# Patient Record
Sex: MALE | Race: WHITE
[De-identification: names, ages, dates, MRNs, and addresses within clinical notes are randomized per-mention and may not be internally consistent; named-entity substitution may affect disease eponyms.]

---

## 2017-01-12 ENCOUNTER — HOSPITAL ENCOUNTER (EMERGENCY)
Dept: HOSPITAL 17 - NEPB | Age: 37
Discharge: HOME | End: 2017-01-12
Payer: MEDICAID

## 2017-01-12 VITALS — BODY MASS INDEX: 34.34 KG/M2 | WEIGHT: 253.53 LBS | HEIGHT: 72 IN

## 2017-01-12 VITALS
TEMPERATURE: 98.2 F | RESPIRATION RATE: 20 BRPM | HEART RATE: 60 BPM | OXYGEN SATURATION: 98 % | SYSTOLIC BLOOD PRESSURE: 133 MMHG | DIASTOLIC BLOOD PRESSURE: 86 MMHG

## 2017-01-12 DIAGNOSIS — J02.9: Primary | ICD-10-CM

## 2017-01-12 DIAGNOSIS — Z72.0: ICD-10-CM

## 2017-01-12 PROCEDURE — 99282 EMERGENCY DEPT VISIT SF MDM: CPT

## 2017-01-12 NOTE — PD
HPI


Chief Complaint:  ENT Complaint


Time Seen by Provider:  14:36


Travel History


International Travel<30 days:  No


Contact w/Intl Traveler<30days:  No


Traveled to known affect area:  No





History of Present Illness


HPI


36 showed male presents to the emergency department for evaluation of throat 

pain for one month.  He states it hurts with swallowing and spitting.  He 

denies any fevers or chills.  Patient reports a history of chronic back pain, 

but takes no prescribed medications.  He states he has taken over-the-counter 

anti-inflammatories as well as magic mouthwash without relief.  Patient denies 

any other symptoms or complaints at this time.





PFSH


Past Medical History


Arthritis:  Yes (DJD L HIP??)


Asthma:  Yes (AS CHILD--GREW OUT OF PER PT)


Diminished Hearing:  No


Genitourinary:  Yes


Headaches:  Yes (OCCASSIONAL)


Musculoskeletal:  Yes (BACK PAIN CHRONIC)


Neurologic:  Yes


Immunizations Current:  No


Migraines:  Yes (OCCASSIONAL)


Renal Failure:  No


Seizures:  Yes (AS CHILD/NONE SINCE CHILD/TAKES NO MEDS)





Past Surgical History


Body Medical Devices:  PEIRCED L EYEBROW/TONGUE


Cardiac Surgery:  Yes (AS CHILD "WATER ON MY HEART")


Ear Surgery:  No


Endocrine Surgery:  No


Eye Surgery:  No


Genitourinary Surgery:  No


Gynecologic Surgery:  No


Oral Surgery:  No


Other Surgery:  Yes (BACK SURGERY)





Social History


Alcohol Use:  No


Tobacco Use:  Yes


Substance Use:  No





Allergies-Medications


(Allergen,Severity, Reaction):  


Coded Allergies:  


     Doxycycline (Verified  Allergy, Severe, Anaphylaxis, 8/14/16)


     Septra (Verified  Allergy, Severe, Anaphylaxis, 8/14/16)


Reported Meds & Prescriptions





Reported Meds & Active Scripts


Active


Zofran ODT (Ondansetron HCl) 4 Mg Tab 4 Mg SL Q6H PRN


     FOR NAUSEA/VOMITING


Protonix (Pantoprazole Sodium) 40 Mg Tabdr 40 Mg PO DAILY








Review of Systems


Except as stated in HPI:  all other systems reviewed are Neg





Physical Exam


Narrative


GENERAL: Well-developed well-nourished male patient, ambulatory.  Afebrile.


SKIN: Warm and dry.


HEAD: Normocephalic.  Atraumatic.


ENT: Mucosa pink and moist. No erythema or exudates. No uvular edema. No uvular

, palatal, or tonsillar deviation. Airway patent. Nasal turbinates appear 

normal without nasal blood, purulent drainage or septal hematoma.  Bilateral 

tympanic membranes are clear without erythema or perforation.


EYES: No scleral icterus. No injection or drainage. 


NECK: Supple, trachea midline. No JVD or lymphadenopathy.


CARDIOVASCULAR: Regular rate and rhythm without murmurs, gallops, or rubs. 


RESPIRATORY: Breath sounds equal bilaterally. No accessory muscle use.  Lungs 

sounds clear to auscultation.


MUSCULOSKELETAL: No cyanosis, or edema.





Data


Data


Last Documented VS





Vital Signs








  Date Time  Temp Pulse Resp B/P Pulse Ox O2 Delivery O2 Flow Rate FiO2


 


1/12/17 14:13 98.2 60 20 133/86 98 Room Air  











MDM


Medical Decision Making


Medical Screen Exam Complete:  Yes


Emergency Medical Condition:  Yes


Medical Record Reviewed:  Yes


Differential Diagnosis


Strep pharyngitis versus viral pharyngitis versus URI


Narrative Course


36-year-old male presents to the emergency department for evaluation of sore 

throat for one month.  He denies any other associated symptoms.  He has not 

followed up outpatient for this issue.  Physical exam is reassuring.  I 

stressed to the patient the need to follow up with an ear, nose, and throat 

physician or a primary care physician.  I'll try the patient a short 

prescription of antibiotics to see if this relieves his throat pain.  However, 

he is to follow up regardless.  He is to return for any acute worsening of 

symptoms.





Diagnosis





 Primary Impression:  


 Pharyngitis


 Qualified Code:  J02.9 - Pharyngitis, unspecified etiology


Referrals:  


Ear / Nose / Throat Specialist


call for appointment


Patient Instructions:  General Instructions, Pharyngitis (ED)


Departure Forms:  Tests/Procedures, Work Release   Enter return to work date:  

Jan 13, 2017





***Additional Instructions:


Take antibiotic as directed until gone.  This is free at [facility].


Follow up with an ENT physician.


Return to the emergency department for any acute, worsening of symptoms.


***Med/Other Pt SpecificInfo:  Prescription(s) given


Scripts


Penicillin V Potassium 500 Mg [id number] Mg PO Q8H  10 Days  Ref 0


   Prov:Hortensia Cifuentes         1/12/17


Disposition:  01 DISCHARGE HOME


Condition:  Stable








Hortensia Cifuentes Jan 12, 2017 14:40

## 2017-04-12 ENCOUNTER — HOSPITAL ENCOUNTER (EMERGENCY)
Dept: HOSPITAL 17 - NEPK | Age: 37
Discharge: HOME | End: 2017-04-12
Payer: MEDICAID

## 2017-04-12 ENCOUNTER — HOSPITAL ENCOUNTER (EMERGENCY)
Dept: HOSPITAL 17 - PHEFT | Age: 37
Discharge: HOME | End: 2017-04-12
Payer: MEDICAID

## 2017-04-12 VITALS
TEMPERATURE: 98.7 F | OXYGEN SATURATION: 100 % | HEART RATE: 52 BPM | SYSTOLIC BLOOD PRESSURE: 133 MMHG | DIASTOLIC BLOOD PRESSURE: 92 MMHG | RESPIRATION RATE: 15 BRPM

## 2017-04-12 VITALS — WEIGHT: 281.97 LBS | HEIGHT: 60 IN | BODY MASS INDEX: 55.36 KG/M2

## 2017-04-12 VITALS
OXYGEN SATURATION: 98 % | HEART RATE: 72 BPM | DIASTOLIC BLOOD PRESSURE: 90 MMHG | TEMPERATURE: 98.4 F | RESPIRATION RATE: 16 BRPM | SYSTOLIC BLOOD PRESSURE: 131 MMHG

## 2017-04-12 VITALS — BODY MASS INDEX: 35.83 KG/M2 | HEIGHT: 72 IN | WEIGHT: 264.55 LBS

## 2017-04-12 DIAGNOSIS — B95.62: ICD-10-CM

## 2017-04-12 DIAGNOSIS — L02.415: Primary | ICD-10-CM

## 2017-04-12 DIAGNOSIS — F17.210: ICD-10-CM

## 2017-04-12 PROCEDURE — 99283 EMERGENCY DEPT VISIT LOW MDM: CPT

## 2017-04-12 PROCEDURE — 86403 PARTICLE AGGLUT ANTBDY SCRN: CPT

## 2017-04-12 PROCEDURE — 87070 CULTURE OTHR SPECIMN AEROBIC: CPT

## 2017-04-12 PROCEDURE — 10061 I&D ABSCESS COMP/MULTIPLE: CPT

## 2017-04-12 PROCEDURE — 87205 SMEAR GRAM STAIN: CPT

## 2017-04-12 PROCEDURE — 87186 SC STD MICRODIL/AGAR DIL: CPT

## 2017-04-12 NOTE — PD
HPI


Chief Complaint:  Pain: Acute or Chronic


Time Seen by Provider:  22:34


Travel History


International Travel<30 days:  No


Contact w/Intl Traveler<30days:  No


Traveled to known affect area:  No





History of Present Illness


HPI


36-year-old white male presents to emergency Department with complaints of pain 

after having an I&D done earlier today at [facility].  The patient states 

that he was prescribed Motrin which has not been improving his pain.  He 

presents for stronger pain medication.





PFSH


Past Medical History


Arthritis:  Yes (DJD L HIP??)


Asthma:  Yes (AS CHILD--GREW OUT OF PER PT)


Diminished Hearing:  No


Genitourinary:  Yes


Headaches:  Yes (OCCASSIONAL)


Musculoskeletal:  Yes (BACK PAIN CHRONIC)


Neurologic:  Yes


Immunizations Current:  No


Migraines:  Yes (OCCASSIONAL)


Renal Failure:  No


Seizures:  Yes (AS CHILD/NONE SINCE CHILD/TAKES NO MEDS)





Past Surgical History


Body Medical Devices:  PEIRCED L EYEBROW/TONGUE


Cardiac Surgery:  Yes (AS CHILD "WATER ON MY HEART")


Ear Surgery:  No


Endocrine Surgery:  No


Eye Surgery:  No


Genitourinary Surgery:  No


Gynecologic Surgery:  No


Oral Surgery:  No


Other Surgery:  Yes (BACK SURGERY)





Social History


Alcohol Use:  No


Tobacco Use:  Yes (1 ppd)


Substance Use:  No





Allergies-Medications


(Allergen,Severity, Reaction):  


Coded Allergies:  


     Doxycycline (Verified  Allergy, Severe, Anaphylaxis, 4/12/17)


     Septra (Verified  Allergy, Severe, Anaphylaxis, 4/12/17)


Reported Meds & Prescriptions





Reported Meds & Active Scripts


Active


Lortab (Hydrocodone-Acetaminophen) 5-325 Mg Tab 1 Tab PO Q8HR PRN


Ibuprofen 800 Mg Tab 800 Mg PO Q8H


Clindamycin (Clindamycin HCl) 150 Mg Cap 450 Mg PO Q6H 10 Days








Review of Systems


Except as stated in HPI:  all other systems reviewed are Neg





Physical Exam


Narrative


GENERAL: This is a well-nourished, well-developed patient, in no apparent 

distress.


SKIN: Patient has a abscess to the right proximal inner thigh.  There is 

packing in place.  There is no fluctuance or pointing.  No discharge.  There is 

some mild surrounding erythema.  Tender to touch.


HEAD: Atraumatic. Normocephalic.


EYES: PERRL, EOMI, no discharge or injection. No scleral icterus.


EARS: Clear


NOSE: Nasal turbinates appear normal.


THROAT: Mucosa pink and moist.  Airway patent.


NECK: Trachea midline. supple, moves head freely.


LUNGS: Clear to auscultation.


CV: Regular in rhythm.


ABDOMEN: Soft nontender.


EXT: No clubbing cyanosis or edema.





Data


Data


Last Documented VS





Vital Signs








  Date Time  Temp Pulse Resp B/P Pulse Ox O2 Delivery O2 Flow Rate FiO2


 


4/12/17 22:00 98.7 52 15 133/92 100 Room Air  








Orders





 Acetamin-Hydrocod 325-5 Mg (Norco  5-325 (4/12/17 22:30)








MDM


Medical Decision Making


Medical Screen Exam Complete:  Yes


Emergency Medical Condition:  Yes


Medical Record Reviewed:  Yes


Differential Diagnosis


MDM: High


Differential diagnoses: Abscess, folliculitis, cellulitis, lymphangitis, 

abrasion, contact dermatitis


Narrative Course


Patient is given one Lortab 5 a grams by mouth.





This is right thigh abscess





Diagnosis





 Primary Impression:  


 Abscess of right thigh


Patient Instructions:  Narcotic given in the ED, General Instructions





***Additional Instructions:


Rest.


Elevation.


keep clean and dry.


remove the packing in two days.


Daily wound care with soap, water and Neosporin.


Continue your antibiotics.


Continue Motrin.


Lortab for additional pain.


Follow-up with a primary care doctor in one week.


Return to the ER for any problems.


***Med/Other Pt SpecificInfo:  Prescription(s) given


Scripts


Hydrocodone-Acetaminophen (Lortab)5-325 Mg Tab1 Tab PO Q8HR PRN (PAIN) #12 TAB


   Prov:Chirag Ibrahim MD         4/12/17


Disposition:  01 DISCHARGE HOME


Condition:  Stable








Kevin Camara Apr 12, 2017 22:38

## 2017-04-12 NOTE — PD
HPI


Chief Complaint:  Skin Problem


Time Seen by Provider:  16:05


Travel History


International Travel<30 days:  No


Contact w/Intl Traveler<30days:  No


Traveled to known affect area:  No





History of Present Illness


HPI


36-year-old male presents the ED for evaluation of 2 day history of red, 

painful area of the right thigh.  Patient can identify no acute injury.  He 

denies fever, chills, previous history of abscess or MRSA.  He states that he 

attempted to squeeze the area yesterday and produced a small amount of pus and 

blood.  Denies numbness, tingling, weakness, limitations to range of motion of 

the lower extremity.  Endorses allergies to doxycycline and Septra.





PFSH


Past Medical History


Arthritis:  Yes (DJD L HIP??)


Asthma:  Yes (AS CHILD--GREW OUT OF PER PT)


Diminished Hearing:  No


Genitourinary:  Yes


Headaches:  Yes (OCCASSIONAL)


Musculoskeletal:  Yes (BACK PAIN CHRONIC)


Neurologic:  Yes


Immunizations Current:  No


Migraines:  Yes (OCCASSIONAL)


Renal Failure:  No


Seizures:  Yes (AS CHILD/NONE SINCE CHILD/TAKES NO MEDS)


Influenza Vaccination:  No





Past Surgical History


Body Medical Devices:  PEIRCED L EYEBROW/TONGUE


Cardiac Surgery:  Yes (AS CHILD "WATER ON MY HEART")


Ear Surgery:  No


Endocrine Surgery:  No


Eye Surgery:  No


Genitourinary Surgery:  No


Gynecologic Surgery:  No


Oral Surgery:  No


Other Surgery:  Yes (BACK SURGERY)





Social History


Alcohol Use:  No


Tobacco Use:  Yes (1 ppd)


Substance Use:  No





Allergies-Medications


(Allergen,Severity, Reaction):  


Coded Allergies:  


     Doxycycline (Verified  Allergy, Severe, Anaphylaxis, 4/12/17)


     Septra (Verified  Allergy, Severe, Anaphylaxis, 4/12/17)


Reported Meds & Prescriptions





Reported Meds & Active Scripts


Active


Ibuprofen 800 Mg Tab 800 Mg PO Q8H


Clindamycin (Clindamycin HCl) 150 Mg Cap 450 Mg PO Q6H 10 Days








Review of Systems


Except as stated in HPI:  all other systems reviewed are Neg





Physical Exam


Narrative


GENERAL: Well-nourished, well-developed white male in no acute distress.


SKIN: Focused skin assessment warm/dry.  Multiple Tattoos.


SKIN: There is an indurated area in the right anterior thigh which measures 

about 3 cm in diameter. It is fluctuant but there is no pointing or drainage. 

There is a zone of inflammation around it but no lymphangitis.


HEAD: Normocephalic.


EYES: No scleral icterus. No injection or drainage. 


NECK: Supple, trachea midline. No JVD or lymphadenopathy.


CARDIOVASCULAR: Regular rate and rhythm without murmurs, gallops, or rubs. 


RESPIRATORY: Breath sounds equal bilaterally. No accessory muscle use.


GASTROINTESTINAL: Abdomen soft, non-tender, nondistended. 


MUSCULOSKELETAL: No cyanosis, or edema.  Patient is ambulatory, walks with 

normal gait.


BACK: Nontender without obvious deformity. No CVA tenderness.





Data


Data


Last Documented VS





Vital Signs








  Date Time  Temp Pulse Resp B/P Pulse Ox O2 Delivery O2 Flow Rate FiO2


 


4/12/17 15:33 98.4 72 16 131/90 98   








Orders





 Abscess Culture And Gram Stain (4/12/17 16:12)


Ibuprofen (Motrin) (4/12/17 16:15)


Lidocai-Epi 1%-1:100,000 Inj (Xylocaine- (4/12/17 16:15)








MDM


Medical Decision Making


Medical Screen Exam Complete:  Yes


Emergency Medical Condition:  Yes


Differential Diagnosis


Folliculitis versus abscess versus cellulitis versus other


Narrative Course


36-year-old male presents the ED for evaluation of 2 day history of red, 

painful area of the right thigh.  Patient can identify no acute injury.  He 

denies fever, chills, previous history of abscess or MRSA.  He states that he 

attempted to squeeze the area yesterday and produced a small amount of pus and 

blood.  Denies numbness, tingling, weakness, limitations to range of motion of 

the lower extremity.  Vitals reviewed.  Patient is afebrile on presentation.  

Physical exam reveals a nontoxic-appearing white male in no acute distress. 

There is an indurated area in the right anterior thigh which measures about 3 

cm in diameter. It is fluctuant but there is no pointing or drainage. There is 

a zone of inflammation around it but no lymphangitis.  Abscess I&D was 

performed.  Please see my procedure note for details.  Patient was prescribed 

clindamycin 450 4 times a day 10 days.  Is also provided a short course of anti

-inflammatories.  He is instructed to return in 48 hours for removal of packing 

and reevaluation of the wound.  He indicated understanding of the instructions 

and is agreeable to the care plan.  He is stable and discharged home.





Procedures


**Procedure Narrative**


INCISION AND DRAINAGE OF ABSCESS: The area was prepped and was sterilely 

draped.  A subcutaneous wheal of 1 % Xylocaine with epinephrine with a total 

number 3 mL was used to anesthetize the area properly.  A number 11 scalpel was 

used to make a 1.25-cm incision across the area of the abscess.  The abscess 

was drained, complex loculations were broken down, and irrigated with normal 

saline.  Cultures were obtained.  Quarter inch iodoform packing was placed in 

the wound. Sterile dressing applied. Patient advised to have packing removed in 

two days.





Diagnosis





 Primary Impression:  


 Abscess of right thigh


Referrals:  


Primary Care Physician


Patient Instructions:  Abscess Incision and Drainage (ED), General Instructions





***Additional Instructions:


Rest, hydrate.


Do not change the dressing unless it becomes saturated or wet.


Take the antibiotics as they are prescribed, even if your symptoms resolve.


800 mg ibuprofen up to 3 times a day to reduce pain and inflammation.


Return to the ED in 48 hours for wound recheck and packing removal.


Return to the ED for any urgent or emergent medical condition.


***Med/Other Pt SpecificInfo:  Prescription(s) given


Scripts


Ibuprofen 800 Mg [id number] Mg PO Q8H  #15 TAB  Ref 0


   Prov:Jayme Warren MD         4/12/17 


Clindamycin 150 Mg [id number] Mg PO Q6H  10 Days  Ref 0


   Prov:Jayme Warren MD         4/12/17


Disposition:  01 DISCHARGE HOME


Condition:  Stable








Veronika Owusu Apr 12, 2017 16:05

## 2017-04-14 ENCOUNTER — HOSPITAL ENCOUNTER (EMERGENCY)
Dept: HOSPITAL 17 - NEPE | Age: 37
Discharge: HOME | End: 2017-04-14
Payer: MEDICAID

## 2017-04-14 VITALS
DIASTOLIC BLOOD PRESSURE: 86 MMHG | RESPIRATION RATE: 20 BRPM | TEMPERATURE: 98.5 F | HEART RATE: 60 BPM | OXYGEN SATURATION: 99 % | SYSTOLIC BLOOD PRESSURE: 137 MMHG

## 2017-04-14 VITALS — RESPIRATION RATE: 18 BRPM

## 2017-04-14 VITALS — WEIGHT: 264.55 LBS | BODY MASS INDEX: 35.83 KG/M2 | HEIGHT: 72 IN

## 2017-04-14 VITALS — OXYGEN SATURATION: 96 %

## 2017-04-14 DIAGNOSIS — R11.0: ICD-10-CM

## 2017-04-14 DIAGNOSIS — L03.115: Primary | ICD-10-CM

## 2017-04-14 DIAGNOSIS — F17.210: ICD-10-CM

## 2017-04-14 DIAGNOSIS — L02.415: ICD-10-CM

## 2017-04-14 LAB
ALP SERPL-CCNC: 76 U/L (ref 45–117)
ALT SERPL-CCNC: 35 U/L (ref 12–78)
ANION GAP SERPL CALC-SCNC: 5 MEQ/L (ref 5–15)
AST SERPL-CCNC: 17 U/L (ref 15–37)
BASOPHILS # BLD AUTO: 0.1 TH/MM3 (ref 0–0.2)
BASOPHILS NFR BLD: 1.1 % (ref 0–2)
BILIRUB SERPL-MCNC: 0.6 MG/DL (ref 0.2–1)
BUN SERPL-MCNC: 9 MG/DL (ref 7–18)
CHLORIDE SERPL-SCNC: 107 MEQ/L (ref 98–107)
EOSINOPHIL # BLD: 0.2 TH/MM3 (ref 0–0.4)
EOSINOPHIL NFR BLD: 3 % (ref 0–4)
ERYTHROCYTE [DISTWIDTH] IN BLOOD BY AUTOMATED COUNT: 13.3 % (ref 11.6–17.2)
GFR SERPLBLD BASED ON 1.73 SQ M-ARVRAT: 94 ML/MIN (ref 89–?)
HCO3 BLD-SCNC: 27.1 MEQ/L (ref 21–32)
HCT VFR BLD CALC: 45.5 % (ref 39–51)
HEMO FLAGS: (no result)
LYMPHOCYTES # BLD AUTO: 1.4 TH/MM3 (ref 1–4.8)
LYMPHOCYTES NFR BLD AUTO: 18.8 % (ref 9–44)
MCH RBC QN AUTO: 30.8 PG (ref 27–34)
MCHC RBC AUTO-ENTMCNC: 34.6 % (ref 32–36)
MCV RBC AUTO: 88.8 FL (ref 80–100)
MONOCYTES NFR BLD: 8.4 % (ref 0–8)
NEUTROPHILS # BLD AUTO: 5 TH/MM3 (ref 1.8–7.7)
NEUTROPHILS NFR BLD AUTO: 68.7 % (ref 16–70)
PLATELET # BLD: 258 TH/MM3 (ref 150–450)
POTASSIUM SERPL-SCNC: 4.3 MEQ/L (ref 3.5–5.1)
RBC # BLD AUTO: 5.13 MIL/MM3 (ref 4.5–5.9)
SODIUM SERPL-SCNC: 139 MEQ/L (ref 136–145)
WBC # BLD AUTO: 7.3 TH/MM3 (ref 4–11)

## 2017-04-14 PROCEDURE — 85025 COMPLETE CBC W/AUTO DIFF WBC: CPT

## 2017-04-14 PROCEDURE — 80053 COMPREHEN METABOLIC PANEL: CPT

## 2017-04-14 PROCEDURE — 83605 ASSAY OF LACTIC ACID: CPT

## 2017-04-14 PROCEDURE — 96365 THER/PROPH/DIAG IV INF INIT: CPT

## 2017-04-14 PROCEDURE — 86140 C-REACTIVE PROTEIN: CPT

## 2017-04-14 PROCEDURE — 96375 TX/PRO/DX INJ NEW DRUG ADDON: CPT

## 2017-04-14 PROCEDURE — 85652 RBC SED RATE AUTOMATED: CPT

## 2017-04-14 PROCEDURE — 99284 EMERGENCY DEPT VISIT MOD MDM: CPT

## 2017-04-14 NOTE — PD
Physical Exam


Date Seen by Provider:  Apr 14, 2017


Time Seen by Provider:  11:28


Narrative


36 year old male presents to the emergency department for re-evaluation of an 

abscess to his right leg.  Patient states he is taking antibiotics but symptoms 

are worsening.  





Vital Signs Reviewed.  Patient awaiting bed placement.





Data


Data


Last Documented VS





Vital Signs








  Date Time  Temp Pulse Resp B/P Pulse Ox O2 Delivery O2 Flow Rate FiO2


 


4/14/17 10:44 98.5 60 20 137/86 99 Room Air  











[facility]


Supervised Visit with RODOLFO:  Hortensia Nuñez Apr 14, 2017 11:30

## 2017-04-14 NOTE — PD
HPI


Chief Complaint:  Skin Problem


Time Seen by Provider:  11:54


Travel History


International Travel<30 days:  No


Contact w/Intl Traveler<30days:  No


Traveled to known affect area:  No





History of Present Illness


HPI


36-year-old male presents to the emergency department for evaluation of 

worsening redness and pain in the right thigh.  Patient was seen here 2 days 

ago for an abscess to the right upper thigh.  He had an I&D performed at that 

time with packing placed and was placed on clindamycin.  Patient states that he 

started taking the clindamycin yesterday morning and has been taking it as 

prescribed.  States that he's had a worsening area of surrounding redness and 

pain on the right upper medial thigh.  States that the size of the redness has 

tripled over the last 3 days.  He denies fever but complains of chills.  

Complains of nausea but denies vomiting.  Denies body aches, discharge or 

drainage.  No other complaints.





PFSH


Past Medical History


Arthritis:  Yes (DJD L HIP??)


Asthma:  Yes (AS CHILD--GREW OUT OF PER PT)


Diminished Hearing:  No


Genitourinary:  Yes


Headaches:  Yes (OCCASSIONAL)


Neurologic:  Yes


Immunizations Current:  No


Migraines:  Yes (OCCASSIONAL)


Renal Failure:  No


Seizures:  Yes (AS CHILD/NONE SINCE CHILD/TAKES NO MEDS)





Past Surgical History


Body Medical Devices:  PEIRCED L EYEBROW/TONGUE


Cardiac Surgery:  Yes (AS CHILD "WATER ON MY HEART")


Ear Surgery:  No


Endocrine Surgery:  No


Eye Surgery:  No


Genitourinary Surgery:  No


Gynecologic Surgery:  No


Oral Surgery:  No


Other Surgery:  Yes (BACK SURGERY)





Social History


Alcohol Use:  No


Tobacco Use:  Yes (1 ppd)


Substance Use:  No





Allergies-Medications


(Allergen,Severity, Reaction):  


Coded Allergies:  


     Doxycycline (Verified  Allergy, Severe, Anaphylaxis, 4/14/17)


     Septra (Verified  Allergy, Severe, Anaphylaxis, 4/14/17)


Reported Meds & Prescriptions





Reported Meds & Active Scripts


Active


Lortab (Hydrocodone-Acetaminophen) 5-325 Mg Tab 1 Tab PO Q8HR PRN


Ibuprofen 800 Mg Tab 800 Mg PO Q8H


Clindamycin (Clindamycin HCl) 150 Mg Cap 450 Mg PO Q6H 10 Days








Review of Systems


Except as stated in HPI:  all other systems reviewed are Neg





Physical Exam


Narrative


GENERAL: Well-nourished and well-developed pleasant patient in moderate amount 

of pain but no acute distress.


SKIN: Warm and dry.  Right upper anterior thigh with open incision and packing 

in place with large surrounding area of erythema, warmth and tenderness 

approximately 13 cm in diameter.  No area of induration or fluctuance.  No 

lymphangitis.


HEAD: Normocephalic and atraumatic.  


EYES: No injection, drainage, or hyphema noted.  PERRLA.  EOMI.  


ENT: No nasal drainage noted.  Oropharynx is clear.


NECK: Supple and the trachea is midline. 


CARDIOVASCULAR: Regular rate and rhythm.


RESPIRATORY: Breath sounds are equal bilaterally with no accessory muscle use, 

wheezing, rhonchi, or crackles.


GASTROINTESTINAL: Abdomen is soft, non-tender, and nondistended.  


MUSCULOSKELETAL: No obvious deformities, swelling, cyanosis, or ecchymosis is 

present throughout the upper and lower extremities.  Patient has full range of 

motion without any signs of neurovascular compromise.  


NEUROLOGICAL: Awake, alert, and oriented. Normal speech and gait. Cranial 

nerves are grossly intact.





Data


Data


Last Documented VS





Vital Signs








  Date Time  Temp Pulse Resp B/P Pulse Ox O2 Delivery O2 Flow Rate FiO2


 


4/14/17 11:41  61 20     


 


4/14/17 10:44 98.5   137/86 99 Room Air  








Orders





 Complete Blood Count With Diff (4/14/17 11:50)


Comprehensive Metabolic Panel (4/14/17 11:50)


Lactic Acid Sepsis Protocol (4/14/17 11:50)


Ecg Monitoring (4/14/17 11:50)


Iv Access Insert/Monitor (4/14/17 11:50)


Oximetry (4/14/17 11:50)


Morphine Inj (Morphine Inj) (4/14/17 12:00)


Ondansetron Inj (Zofran Inj) (4/14/17 12:00)


Sodium Chlor 0.9% 1000 Ml Inj (Ns 1000 M (4/14/17 11:50)


Sodium Chloride 0.9% Flush (Ns Flush) (4/14/17 12:00)


Westergren Sedimentation Rate (4/14/17 11:50)


C-Reactive Protein (Crp) (4/14/17 11:50)


Clindamycin Inj (Cleocin Inj) (4/14/17 12:00)


Ketorolac Inj (Toradol Inj) (4/14/17 13:00)





Labs





 Laboratory Tests








Test 4/14/17





 11:55


 


White Blood Count 7.3 TH/MM3


 


Red Blood Count 5.13 MIL/MM3


 


Hemoglobin 15.8 GM/DL


 


Hematocrit 45.5 %


 


Mean Corpuscular Volume 88.8 FL


 


Mean Corpuscular Hemoglobin 30.8 PG


 


Mean Corpuscular Hemoglobin 34.6 %





Concent 


 


Red Cell Distribution Width 13.3 %


 


Platelet Count 258 TH/MM3


 


Mean Platelet Volume 9.1 FL


 


Neutrophils (%) (Auto) 68.7 %


 


Lymphocytes (%) (Auto) 18.8 %


 


Monocytes (%) (Auto) 8.4 %


 


Eosinophils (%) (Auto) 3.0 %


 


Basophils (%) (Auto) 1.1 %


 


Neutrophils # (Auto) 5.0 TH/MM3


 


Lymphocytes # (Auto) 1.4 TH/MM3


 


Monocytes # (Auto) 0.6 TH/MM3


 


Eosinophils # (Auto) 0.2 TH/MM3


 


Basophils # (Auto) 0.1 TH/MM3


 


CBC Comment DIFF FINAL 


 


Differential Comment  


 


Erythrocyte Sedimentation Rate 7 mm/hr


 


Sodium Level 139 MEQ/L


 


Potassium Level 4.3 MEQ/L


 


Chloride Level 107 MEQ/L


 


Carbon Dioxide Level 27.1 MEQ/L


 


Anion Gap 5 MEQ/L


 


Blood Urea Nitrogen 9 MG/DL


 


Creatinine 0.91 MG/DL


 


Estimat Glomerular Filtration 94 ML/MIN





Rate 


 


Random Glucose 92 MG/DL


 


Lactic Acid Level 1.4 mmol/L


 


Calcium Level 8.5 MG/DL


 


Total Bilirubin 0.6 MG/DL


 


Aspartate Amino Transf 17 U/L





(AST/SGOT) 


 


Alanine Aminotransferase 35 U/L





(ALT/SGPT) 


 


Alkaline Phosphatase 76 U/L


 


C-Reactive Protein 1.50 MG/DL


 


Total Protein 7.0 GM/DL


 


Albumin 3.5 GM/DL











[facility]


Medical Decision Making


Medical Screen Exam Complete:  Yes


Emergency Medical Condition:  Yes


Differential Diagnosis


Cellulitis versus abscess versus failed outpatient therapy versus sepsis


Narrative Course


36-year-old male presents to the emergency department for evaluation of 

worsening right thigh redness and pain.  Patient is afebrile, vital signs are 

stable.  He had an I&D of an abscess performed 2 days ago and has been taking 

antibiotics since yesterday morning.  His symptoms have continued to worsen 

despite antibiotic therapy.  I did look at the EMR which shows his wound 

culture was positive for MRSA and that it is susceptible to clindamycin.  IV 

access is obtained, labs drawn and sent.  Patient is placed on cardiac 

telemetry and pulse oximetry monitoring.  Patient is administered morphine 4 mg 

IV, Zofran 4 mg IV, IV fluids and clindamycin 600 mg IV.  





CBC is unremarkable.


Sedimentation rate is within normal limits.


CMP is unremarkable.


CRP is slightly elevated at 1.5.


Lactic acid is within normal limits at 1.4.





Patient has remained stable and without complaint while here in the emergency 

department.  The patient's labs are reassuring and his wound culture shows 

susceptible to clindamycin.  He is only been on the antibiotic for one full 

day.  I think it would be appropriate for the patient to continue to take 

antibiotics as an outpatient at this time and return for worsening of symptoms.

  I did use skin marker to outline the area of redness and he is given strict 

instructions to return if the redness extends outside of this line.  I 

discussed all this with the patient was comfortable with this plan.





I discussed the case with my attending physician Dr. Warren who is aware of 

the patients history, physical examination findings, and treatment plan.





Diagnosis





 Primary Impression:  


 Cellulitis of right thigh


 Additional Impression:  


 Abscess of right thigh


Referrals:  


Primary Care Physician


Patient Instructions:  Abscess (ED), Cellulitis (ED), General Instructions





***Additional Instructions:


Continue taking antibiotics as prescribed.  Do not take Lortab with alcohol or 

while driving.


Follow-up with your Primary Care Physician.  


Return to the ED for any acute worsening of symptoms such as worsening redness 

outside the skin markers, fever, worsening pain.


***Med/Other Pt SpecificInfo:  Prescription(s) given


Scripts


Hydrocodone-Acetaminophen (Lortab)5-325 Mg Tab1 Tab PO Q6H PRN (PAIN GREATER 

THAN 6) #12 TAB  Ref 0


   Prov:Jayme Warren MD         4/14/17


Disposition:  01 DISCHARGE HOME


Condition:  Stable








Xenia Maxwell Apr 14, 2017 11:58

## 2017-07-11 ENCOUNTER — HOSPITAL ENCOUNTER (EMERGENCY)
Dept: HOSPITAL 17 - PHEFT | Age: 37
Discharge: HOME | End: 2017-07-11
Payer: MEDICAID

## 2017-07-11 VITALS
TEMPERATURE: 98.7 F | OXYGEN SATURATION: 100 % | DIASTOLIC BLOOD PRESSURE: 107 MMHG | RESPIRATION RATE: 20 BRPM | SYSTOLIC BLOOD PRESSURE: 154 MMHG | HEART RATE: 53 BPM
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
## 2017-07-11 DIAGNOSIS — S61.245A: Primary | ICD-10-CM

## 2017-07-11 DIAGNOSIS — F17.210: ICD-10-CM

## 2017-07-11 PROCEDURE — 10120 INC&RMVL FB SUBQ TISS SMPL: CPT

## 2017-07-11 NOTE — PD
HPI


Chief Complaint:  Skin Problem


Time Seen by Provider:  20:20


Travel History


International Travel<30 days:  No


Contact w/Intl Traveler<30days:  No


Traveled to known affect area:  No





History of Present Illness


HPI


37-year-old male presents to the emergency room for evaluation of a foreign 

body to his left fourth finger that occurred earlier today.  Patient was 

cutting a large palm tree when a branch started falling towards his face.  He 

put up his hand to protect his face 2 spikes stuck into his hand.  He was able 

to remove 1 but the finger foreign body was too deep.  He tried to go on about 

his day but the other foreign body in his left fourth finger was too painful.  

Unknown last tetanus.  No chronic medical conditions or daily medications.





PFSH


Past Medical History


Arthritis:  Yes (DJD L HIP??)


Asthma:  Yes (AS CHILD--GREW OUT OF PER PT)


Diminished Hearing:  No


Genitourinary:  Yes


Headaches:  Yes (OCCASSIONAL)


Neurologic:  Yes


Immunizations Current:  No


Migraines:  Yes (OCCASSIONAL)


Renal Failure:  No


Seizures:  Yes (AS CHILD/NONE SINCE CHILD/TAKES NO MEDS)





Past Surgical History


Body Medical Devices:  PEIRCED L EYEBROW/TONGUE


Cardiac Surgery:  Yes (AS CHILD "WATER ON MY HEART")


Ear Surgery:  No


Endocrine Surgery:  No


Eye Surgery:  No


Genitourinary Surgery:  No


Gynecologic Surgery:  No


Oral Surgery:  No


Other Surgery:  Yes (BACK SURGERY)





Social History


Alcohol Use:  No


Tobacco Use:  Yes (1 ppd)


Substance Use:  No





Allergies-Medications


(Allergen,Severity, Reaction):  


Coded Allergies:  


     Doxycycline (Verified  Allergy, Severe, Anaphylaxis, 7/11/17)


     Septra (Verified  Allergy, Severe, Anaphylaxis, 7/11/17)


     *MDRO Multi-Drug Resistant Organism (Verified  Adverse Reaction, Unknown, 7 /11/17)


 MRSA (thigh)-04/12/17


Reported Meds & Prescriptions





Reported Meds & Active Scripts


Active


No Active Prescriptions or Reported Medications    








Review of Systems


Except as stated in HPI:  all other systems reviewed are Neg





Physical Exam


Narrative


GENERAL: Well-nourished, well-developed male in no acute distress.  Afebrile.  

Ambulatory.


SKIN: Focused skin assessment warm/dry.  There is a puncture wound at the tip 

of the left fourth finger with a black foreign body.


HEAD: Normocephalic.


EYES: No scleral icterus. No injection or drainage. 


NECK: Supple, trachea midline. No JVD or lymphadenopathy.


CARDIOVASCULAR: Regular rate and rhythm without murmurs, gallops, or rubs. 


RESPIRATORY: Breath sounds equal bilaterally. No accessory muscle use.


PSYCHIATRIC: No delusional thought processes. No hallucinations.





Data


Data


Last Documented VS





Vital Signs








  Date Time  Temp Pulse Resp B/P Pulse Ox O2 Delivery O2 Flow Rate FiO2


 


7/11/17 20:05 98.7 53 20 154/107 100   








Orders





 Lidocaine Pf 1% Inj (Xylocaine-Mpf 1% In (7/11/17 20:30)








MDM


Medical Decision Making


Medical Screen Exam Complete:  Yes


Emergency Medical Condition:  Yes


Medical Record Reviewed:  Yes


Differential Diagnosis


Soft tissue foreign body, abrasion, laceration, contusion


Narrative Course


37-year-old male presents to the emergency room for evaluation of foreign body 

to the left fourth finger that occurred earlier today.  Patient was trying to 

"tough it out" but states it is too painful.  Physical exam reveals a puncture 

wound to the tip of the left fourth finger with a black body.  A 1 cm foreign 

body was removed without difficulty.  Patient discharged with wound care 

instructions and told to follow up with a primary care physician or return for 

worsening symptoms.





Procedures


**Procedure Narrative**


Foreign body removal: The area was prepped with Betadine.  A subcutaneous wheal 

of 1% lidocaine with a total number 2 mL was used to anesthetize the area 

properly.  A number 11 scalpel was used to make a 0.5 cm incision across the 

foreign body.  Foreign body was removed with forceps without difficulty.  

Sterile dressing applied.





Diagnosis





 Primary Impression:  


 Soft tissues foreign body


Referrals:  


Primary Care Physician


Patient Instructions:  General Instructions, Soft Tissue Foreign Body (ED)





***Additional Instructions:


Rest and drink plenty of fluids.


Keep wound clean and dry.  Apply triple antibiotic ointment daily.


Take ibuprofen with food as directed, as needed for pain.


Follow-up with a primary care physician.


Return to the emergency room for worsening symptoms.


***Med/Other Pt SpecificInfo:  Prescription(s) given


Scripts


No Active Prescriptions or Reported Meds


Disposition:  01 DISCHARGE HOME


Condition:  Stable








Giana Langston Jul 11, 2017 20:54

## 2017-12-12 ENCOUNTER — HOSPITAL ENCOUNTER (EMERGENCY)
Dept: HOSPITAL 17 - PHED | Age: 37
Discharge: HOME | End: 2017-12-12
Payer: MEDICAID

## 2017-12-12 VITALS — DIASTOLIC BLOOD PRESSURE: 74 MMHG | SYSTOLIC BLOOD PRESSURE: 140 MMHG

## 2017-12-12 VITALS
SYSTOLIC BLOOD PRESSURE: 152 MMHG | RESPIRATION RATE: 20 BRPM | OXYGEN SATURATION: 99 % | DIASTOLIC BLOOD PRESSURE: 94 MMHG | TEMPERATURE: 97.9 F | HEART RATE: 55 BPM

## 2017-12-12 VITALS — HEIGHT: 72 IN | BODY MASS INDEX: 36.37 KG/M2 | WEIGHT: 268.52 LBS

## 2017-12-12 DIAGNOSIS — F17.200: ICD-10-CM

## 2017-12-12 DIAGNOSIS — N50.812: Primary | ICD-10-CM

## 2017-12-12 LAB
COLOR UR: YELLOW
COMMENT (UR): (no result)
CULTURE IF INDICATED: (no result)
GLUCOSE UR STRIP-MCNC: (no result) MG/DL
HGB UR QL STRIP: (no result)
KETONES UR STRIP-MCNC: (no result) MG/DL
LEUKOCYTE ESTERASE UR QL STRIP: (no result) /HPF (ref 0–5)
NITRITE UR QL STRIP: (no result)
SP GR UR STRIP: 1.02 (ref 1–1.03)
SQUAMOUS #/AREA URNS HPF: (no result) /HPF (ref 0–5)

## 2017-12-12 PROCEDURE — 76870 US EXAM SCROTUM: CPT

## 2017-12-12 PROCEDURE — 99284 EMERGENCY DEPT VISIT MOD MDM: CPT

## 2017-12-12 PROCEDURE — 87591 N.GONORRHOEAE DNA AMP PROB: CPT

## 2017-12-12 PROCEDURE — 81001 URINALYSIS AUTO W/SCOPE: CPT

## 2017-12-12 PROCEDURE — 96372 THER/PROPH/DIAG INJ SC/IM: CPT

## 2017-12-12 PROCEDURE — 93975 VASCULAR STUDY: CPT

## 2017-12-12 PROCEDURE — 87491 CHLMYD TRACH DNA AMP PROBE: CPT

## 2017-12-12 NOTE — RADRPT
EXAM DATE/TIME:  12/12/2017 21:34 

 

[facility] COMPARISON:     

No previous studies available for comparison.

        

 

 

INDICATIONS :     

Left testicular pain. 

                     

 

MEDICAL HISTORY :        

Arthritis.  Seizures. Migraines. Numbness LLE. Asthma. MRSA. 

 

SURGICAL HISTORY :      

Cardiac surgery. Back surgery. 

 

ENCOUNTER:      

Initial

 

ACUITY:      

2 days

 

PAIN SCORE:      

10/10

 

LOCATION:      

Bilateral  scrotum. 

                     

 

MEASUREMENTS:     

 

RIGHT TESTICLE:        

2.8 x 3.7 x 2.2cm     

 

LEFT TESTICLE:        

2.5 x 3.5 x 2.1cm     

 

FINDINGS:     

Positive testicular blood flow bilaterally. No testicular mass. Mild left varicocele. No hydrocele. N
o epididymal.

 

CONCLUSION:     

1. Positive testicular blood flow without mass. No acute findings. Mild to moderate left-sided varico
curtis.

 

 

 

 Kevin Head MD on December 12, 2017 at 22:21           

Board Certified Radiologist.

 This report was verified electronically.

## 2017-12-12 NOTE — PD
HPI


Chief Complaint:   Complaint


Time Seen by Provider:  21:25


Travel History


International Travel<30 days:  No


Contact w/Intl Traveler<30days:  No


Traveled to known affect area:  No





History of Present Illness


HPI


37-year-old male with history of left scrotal injury as a child needing 

stitches presents the emergency department for evaluation of left testicular 

pain for the past few days.  Patient states she is also feeling the urge to 

urinate.  States he Also started turning red and swollen.  Endorse some mild 

abdominal cramping but no nausea no vomiting no diarrhea.  He states that his 

girlfriend had been diagnosed with VRE and doesn't know if this has something 

to do with it.  Denies any history of discharge.  States the symptoms a never 

happened to him before.  States the pain is moderate, left testicle, no 

radiation, associated signs symptoms as above, context as above.





PFSH


Past Medical History


Arthritis:  Yes (DJD L HIP??)


Asthma:  Yes (AS CHILD--GREW OUT OF PER PT)


Autoimmune Disease:  No


Diminished Hearing:  No


Genitourinary:  Yes


Headaches:  Yes (OCCASSIONAL)


Neurologic:  Yes


Immunizations Current:  No


Migraines:  Yes (OCCASSIONAL)


Renal Failure:  No


Seizures:  Yes (AS CHILD/NONE SINCE CHILD/TAKES NO MEDS)


Tetanus Vaccination:  > 5 Years


Influenza Vaccination:  No





Past Surgical History


Body Medical Devices:  PEIRCED L EYEBROW/TONGUE


Cardiac Surgery:  Yes (AS CHILD "WATER ON MY HEART")


Ear Surgery:  No


Endocrine Surgery:  No


Eye Surgery:  No


Genitourinary Surgery:  No


Gynecologic Surgery:  No


Oral Surgery:  No


Thoracic Surgery:  No


Other Surgery:  Yes (BACK SURGERY)





Social History


Alcohol Use:  No


Tobacco Use:  Yes (1 ppd)


Substance Use:  No





Allergies-Medications


(Allergen,Severity, Reaction):  


Coded Allergies:  


     doxycycline (Unverified  Allergy, Severe, Anaphylaxis, 12/12/17)


     sulfamethoxazole (Unverified  Allergy, Severe, Anaphylaxis, 12/12/17)


     trimethoprim (Unverified  Allergy, Severe, Anaphylaxis, 12/12/17)


     *MDRO Multi-Drug Resistant Organism (Verified  Adverse Reaction, Unknown, 

12/12/17)


 MRSA (thigh)-04/12/17


Reported Meds & Prescriptions





Reported Meds & Active Scripts


Active


Ultram (Tramadol HCl) 50 Mg Tab 50 Mg PO Q6H PRN


Levaquin (Levofloxacin) 750 Mg Tablet 750 Mg PO DAILY 14 Days








Review of Systems


Except as stated in HPI:  all other systems reviewed are Neg





Physical Exam


Narrative


GENERAL: Well-developed well-nourished, no obvious distress.


SKIN: Focused skin assessment warm/dry.


HEAD: Atraumatic. Normocephalic. 


EYES: Pupils equal and round. No scleral icterus. No injection or drainage. 


ENT: No nasal bleeding or discharge.  Mucous membranes pink and moist.


NECK: Trachea midline. No JVD. 


CARDIOVASCULAR: Regular rate and rhythm.  No murmur appreciated.


RESPIRATORY: No accessory muscle use. Clear to auscultation. Breath sounds 

equal bilaterally. 


GASTROINTESTINAL: Abdomen soft, non-tender, nondistended. Hepatic and splenic 

margins not palpable. 


GENITOURINARY: Grossly normal scrotum skin, testicles are normal in size, 

minimal tenderness in the superior aspect of the left testicle probably 

consistent with epididymitis, uncircumcised penis without discharge.  No 

hernia.  No abscess no cellulitis.


MUSCULOSKELETAL: No obvious deformities. No clubbing.  No cyanosis.  No edema. 


NEUROLOGICAL: Awake and alert. No obvious cranial nerve deficits.  Motor 

grossly within normal limits. Normal speech.


PSYCHIATRIC: Appropriate mood and affect; insight and judgment normal.





Data


Data


Last Documented VS





Vital Signs








  Date Time  Temp Pulse Resp B/P (MAP) Pulse Ox O2 Delivery O2 Flow Rate FiO2


 


12/12/17 22:51  88 18 140/74 (96) 99   


 


12/12/17 21:17 97.9       








Orders





 Orders


Urinalysis - C+S If Indicated (12/12/17 21:26)


Gc And Chlamydia Pcr (12/12/17 21:26)


Us Testicles W Doppler (12/12/17 )


Ketorolac Inj (Toradol Inj) (12/12/17 21:30)


Ceftriaxone Inj (Rocephin Inj) (12/12/17 22:30)


Azithromycin (Zithromax) (12/12/17 22:30)


Ed Discharge Order (12/12/17 22:30)





Labs





Laboratory Tests








Test


  12/12/17


21:33


 


Urine Color YELLOW 


 


Urine Turbidity CLEAR 


 


Urine pH 6.0 


 


Urine Specific Gravity 1.017 


 


Urine Protein NEG mg/dL 


 


Urine Glucose (UA) NEG mg/dL 


 


Urine Ketones NEG mg/dL 


 


Urine Occult Blood NEG 


 


Urine Nitrite NEG 


 


Urine Bilirubin NEG 


 


Urine Leukocyte Esterase NEG 


 


Urine WBC 0-2 /hpf 


 


Urine Squamous Epithelial


Cells 0-5 /hpf 


 


 


Microscopic Urinalysis Comment


  CULT NOT


INDICATED











MDM


Medical Decision Making


Medical Screen Exam Complete:  Yes


Emergency Medical Condition:  Yes


Differential Diagnosis


epididymitis, orchitis, torsion unlikely, varicocele, hernia unlikely.


Narrative Course


Patient roomed emergency department, UA and ultrasound are reassuring, given 

the location of his tenderness in the epididymis we'll cover for epididymitis, 

Rocephin and azithromycin were given in the ER and will prescribe prescription 

for Levaquin.  Still somewhat uncomfortable after Toradol the patient is 

planning on driving home, offered prescription for Ultram and he is accepting.  

Discussed return to ED criteria follow-up with a primary care physician or the 

[facility].  He is stable for discharge.





Diagnosis





 Primary Impression:  


 Testicular pain, left


***Med/Other Pt SpecificInfo:  Prescription(s) given


Scripts


Tramadol (Ultram) 50 Mg Tab


50 MG PO Q6H Y for PAIN, #10 TAB 0 Refills


   Prov: Walt Cline MD         12/12/17 


Levofloxacin (Levaquin) 750 Mg Tablet


750 MG PO DAILY for Infection for 14 Days, #14 TAB 0 Refills


   Prov: Walt Cline MD         12/12/17


Disposition:  01 DISCHARGE HOME


Condition:  Stable











Walt Cline MD Dec 12, 2017 21:53

## 2017-12-13 LAB
CHLAMYDIA PCR: NOT DETECTED
NEISSERIA PCR: NOT DETECTED

## 2018-01-23 ENCOUNTER — HOSPITAL ENCOUNTER (EMERGENCY)
Dept: HOSPITAL 17 - PHED | Age: 38
Discharge: HOME | End: 2018-01-23
Payer: MEDICAID

## 2018-01-23 VITALS
HEART RATE: 64 BPM | SYSTOLIC BLOOD PRESSURE: 122 MMHG | OXYGEN SATURATION: 99 % | TEMPERATURE: 97.9 F | DIASTOLIC BLOOD PRESSURE: 77 MMHG | RESPIRATION RATE: 20 BRPM

## 2018-01-23 VITALS — BODY MASS INDEX: 35.33 KG/M2 | HEIGHT: 72 IN | WEIGHT: 260.81 LBS

## 2018-01-23 VITALS
SYSTOLIC BLOOD PRESSURE: 118 MMHG | RESPIRATION RATE: 16 BRPM | DIASTOLIC BLOOD PRESSURE: 82 MMHG | HEART RATE: 50 BPM | OXYGEN SATURATION: 98 %

## 2018-01-23 DIAGNOSIS — R11.0: ICD-10-CM

## 2018-01-23 DIAGNOSIS — R10.13: Primary | ICD-10-CM

## 2018-01-23 DIAGNOSIS — F17.200: ICD-10-CM

## 2018-01-23 LAB
ALBUMIN SERPL-MCNC: 3.6 GM/DL (ref 3.4–5)
ALP SERPL-CCNC: 79 U/L (ref 45–117)
ALT SERPL-CCNC: 33 U/L (ref 12–78)
AST SERPL-CCNC: 22 U/L (ref 15–37)
BASOPHILS # BLD AUTO: 0.1 TH/MM3 (ref 0–0.2)
BASOPHILS NFR BLD: 1 % (ref 0–2)
BILIRUB SERPL-MCNC: 0.5 MG/DL (ref 0.2–1)
BUN SERPL-MCNC: 9 MG/DL (ref 7–18)
CALCIUM SERPL-MCNC: 8.5 MG/DL (ref 8.5–10.1)
CHLORIDE SERPL-SCNC: 103 MEQ/L (ref 98–107)
COLOR UR: YELLOW
CREAT SERPL-MCNC: 0.79 MG/DL (ref 0.6–1.3)
EOSINOPHIL # BLD: 0.3 TH/MM3 (ref 0–0.4)
EOSINOPHIL NFR BLD: 3.6 % (ref 0–4)
ERYTHROCYTE [DISTWIDTH] IN BLOOD BY AUTOMATED COUNT: 12.5 % (ref 11.6–17.2)
GFR SERPLBLD BASED ON 1.73 SQ M-ARVRAT: 110 ML/MIN (ref 89–?)
GLUCOSE SERPL-MCNC: 88 MG/DL (ref 74–106)
GLUCOSE UR STRIP-MCNC: (no result) MG/DL
HCO3 BLD-SCNC: 28.2 MEQ/L (ref 21–32)
HCT VFR BLD CALC: 46.3 % (ref 39–51)
HGB BLD-MCNC: 15 GM/DL (ref 13–17)
HGB UR QL STRIP: (no result)
KETONES UR STRIP-MCNC: (no result) MG/DL
LEUKOCYTE ESTERASE UR QL STRIP: (no result) /HPF (ref 0–5)
LIPASE: 59 U/L (ref 73–393)
LYMPHOCYTES # BLD AUTO: 3.4 TH/MM3 (ref 1–4.8)
LYMPHOCYTES NFR BLD AUTO: 39.3 % (ref 9–44)
MCH RBC QN AUTO: 29.3 PG (ref 27–34)
MCHC RBC AUTO-ENTMCNC: 32.4 % (ref 32–36)
MCV RBC AUTO: 90.3 FL (ref 80–100)
MONOCYTE #: 0.7 TH/MM3 (ref 0–0.9)
MONOCYTES NFR BLD: 8.1 % (ref 0–8)
NEUTROPHILS # BLD AUTO: 4.1 TH/MM3 (ref 1.8–7.7)
NEUTROPHILS NFR BLD AUTO: 48 % (ref 16–70)
NITRITE UR QL STRIP: (no result)
PLATELET # BLD: 243 TH/MM3 (ref 150–450)
PMV BLD AUTO: 9 FL (ref 7–11)
PROT SERPL-MCNC: 7.3 GM/DL (ref 6.4–8.2)
RBC # BLD AUTO: 5.13 MIL/MM3 (ref 4.5–5.9)
SODIUM SERPL-SCNC: 136 MEQ/L (ref 136–145)
SP GR UR STRIP: (no result) (ref 1–1.03)
SQUAMOUS #/AREA URNS HPF: (no result) /HPF (ref 0–5)
URINE LEUKOCYTE ESTERASE: (no result)
WBC # BLD AUTO: 8.6 TH/MM3 (ref 4–11)

## 2018-01-23 PROCEDURE — 99284 EMERGENCY DEPT VISIT MOD MDM: CPT

## 2018-01-23 PROCEDURE — 74019 RADEX ABDOMEN 2 VIEWS: CPT

## 2018-01-23 PROCEDURE — 96372 THER/PROPH/DIAG INJ SC/IM: CPT

## 2018-01-23 PROCEDURE — 85025 COMPLETE CBC W/AUTO DIFF WBC: CPT

## 2018-01-23 PROCEDURE — 80053 COMPREHEN METABOLIC PANEL: CPT

## 2018-01-23 PROCEDURE — 83690 ASSAY OF LIPASE: CPT

## 2018-01-23 PROCEDURE — 81001 URINALYSIS AUTO W/SCOPE: CPT

## 2018-01-23 NOTE — RADRPT
EXAM DATE/TIME:  01/23/2018 21:45 

 

[facility] COMPARISON:     

No previous studies available for comparison.

 

                     

INDICATIONS :     

Left upper quadrant pain today.

                     

 

MEDICAL HISTORY :     

None.          

 

SURGICAL HISTORY :     

None.   

 

ENCOUNTER:     

Initial                                        

 

ACUITY:     

1 day      

 

PAIN SCORE:     

9/10

 

LOCATION:     

Left  upper abdomen.

 

FINDINGS:     

Supine and upright views of the abdomen were performed.  The abdominal bowel gas pattern is normal.  
No air fluid levels are seen.  No abnormal masses, calcifications, or organomegaly is seen.  The visu
alized lower lungs are clear.  No evidence of free intraperitoneal gas.  The osseous structures are u
nremarkable.

 

CONCLUSION:     

1. No acute findings.

 

 

 

 Kevin Haed MD on January 23, 2018 at 22:12           

Board Certified Radiologist.

 This report was verified electronically.

## 2018-01-23 NOTE — PD
HPI


.


Epigastric pain


Chief Complaint:  Flank/Kidney Pain


Time Seen by Provider:  21:00


Travel History


International Travel<30 days:  No


Contact w/Intl Traveler<30days:  No


Traveled to known affect area:  No





History of Present Illness


HPI


This patient presents with a chief complaint of epigastric pain that radiates 

around his left abdomen and into his back.  Onset was this morning.  He 

describes the pain as sharp.  He rates it 5/10.  It is exacerbated by eating 

and is somewhat relieved by Tylenol and Advil.  It is associated with nausea 

but no vomiting or diarrhea.  No constipation.  He reports chronic hesitancy of 

urination because of complications of back surgery.  He states that this is 

getting progressively worse but did not become acutely worse today.





PFSH


Past Medical History


Arthritis:  Yes (DJD L HIP??)


Asthma:  Yes (AS CHILD--GREW OUT OF PER PT)


Autoimmune Disease:  No


Diminished Hearing:  No


Genitourinary:  Yes


Headaches:  Yes (OCCASSIONAL)


Neurologic:  Yes


Immunizations Current:  No


Migraines:  Yes (OCCASSIONAL)


Renal Failure:  No


Seizures:  Yes (AS CHILD/NONE SINCE CHILD/TAKES NO MEDS)





Past Surgical History


Body Medical Devices:  PEIRCED L EYEBROW/TONGUE


Cardiac Surgery:  Yes (AS CHILD "WATER ON MY HEART")


Ear Surgery:  No


Endocrine Surgery:  No


Eye Surgery:  No


Genitourinary Surgery:  No


Gynecologic Surgery:  No


Oral Surgery:  No


Thoracic Surgery:  No


Other Surgery:  Yes (BACK SURGERY)





Social History


Alcohol Use:  No


Tobacco Use:  Yes (1 ppd)


Substance Use:  No





Allergies-Medications


(Allergen,Severity, Reaction):  


Coded Allergies:  


     doxycycline (Unverified  Allergy, Severe, Anaphylaxis, 12/12/17)


     sulfamethoxazole (Unverified  Allergy, Severe, Anaphylaxis, 12/12/17)


     trimethoprim (Unverified  Allergy, Severe, Anaphylaxis, 12/12/17)


     *MDRO Multi-Drug Resistant Organism (Verified  Adverse Reaction, Unknown, 

12/12/17)


 MRSA (thigh)-04/12/17


Reported Meds & Prescriptions





Reported Meds & Active Scripts


Active


Ultram (Tramadol HCl) 50 Mg Tab 50 Mg PO Q6H PRN


Levaquin (Levofloxacin) 750 Mg Tablet 750 Mg PO DAILY 14 Days








Review of Systems


Except as stated in HPI:  all other systems reviewed are Neg


General / Constitutional:  No: Fever, Chills


Gastrointestinal:  Positive: Nausea, Abdominal Pain, Loss of Appetite, No: 

Vomiting, Diarrhea, Constipation


Genitourinary:  Positive: Hesitancy, No: Urgency, Frequency, Dysuria, Hematuria





Physical Exam


Narrative


GENERAL: Awake and alert.


SKIN:  warm/dry.  Normal color and turgor.


HEAD: Normocephalic.  Atraumatic.


EYES: Pupils equal and round. No scleral icterus. No injection or drainage. 


ENT: No nasal bleeding or discharge.  Mucous membranes pink and moist.


NECK: Trachea midline.  Full range of motion without pain.. 


CARDIOVASCULAR: Regular rate and rhythm.  Heart sounds normal.


RESPIRATORY: No accessory muscle use. Clear to auscultation. Breath sounds 

equal bilaterally. 


GASTROINTESTINAL: Abdomen soft.  Mild epigastric tenderness without guarding or 

rebound.  Bowel sounds present.  Nondistended.


: No CVA tenderness.


MUSCULOSKELETAL: No obvious deformities. 


NEUROLOGICAL: Awake and alert. No obvious cranial nerve deficits.  Motor 

grossly within normal limits. Normal speech.


PSYCHIATRIC: Appropriate mood and affect; insight and judgment normal.





Data


Data


Last Documented VS





Vital Signs








  Date Time  Temp Pulse Resp B/P (MAP) Pulse Ox O2 Delivery O2 Flow Rate FiO2


 


1/23/18 19:38 97.9 64 20 122/77 (92) 99   








Orders





 Orders


Complete Blood Count With Diff (1/23/18 21:05)


Comprehensive Metabolic Panel (1/23/18 21:05)


Lipase (1/23/18 21:05)


Urinalysis - C+S If Indicated (1/23/18 21:05)


Abdomen, Flat & Upright (1/23/18 )


Iv Access Insert/Monitor (1/23/18 21:05)


Sodium Chloride 0.9% Flush (Ns Flush) (1/23/18 21:15)


Simethicone Chew (Phazyme Chew) (1/23/18 22:15)





Labs





Laboratory Tests








Test


  1/23/18


19:26 1/23/18


21:15


 


Urine Color YELLOW  


 


Urine Turbidity CLEAR  


 


Urine pH 6.0  


 


Urine Specific Gravity


  GREATER THAN


1.035 


 


 


Urine Protein NEG mg/dL  


 


Urine Glucose (UA) NEG mg/dL  


 


Urine Ketones NEG mg/dL  


 


Urine Occult Blood NEG  


 


Urine Nitrite NEG  


 


Urine Bilirubin NEG  


 


Urine Leukocyte Esterase NEG  


 


Urine WBC 0-2 /hpf  


 


Urine Squamous Epithelial


Cells 0-5 /hpf 


  


 


 


Microscopic Urinalysis Comment


  CULT NOT


INDICATED 


 


 


White Blood Count  8.6 TH/MM3 


 


Red Blood Count  5.13 MIL/MM3 


 


Hemoglobin  15.0 GM/DL 


 


Hematocrit  46.3 % 


 


Mean Corpuscular Volume  90.3 FL 


 


Mean Corpuscular Hemoglobin  29.3 PG 


 


Mean Corpuscular Hemoglobin


Concent 


  32.4 % 


 


 


Red Cell Distribution Width  12.5 % 


 


Platelet Count  243 TH/MM3 


 


Mean Platelet Volume  9.0 FL 


 


Neutrophils (%) (Auto)  48.0 % 


 


Lymphocytes (%) (Auto)  39.3 % 


 


Monocytes (%) (Auto)  8.1 % 


 


Eosinophils (%) (Auto)  3.6 % 


 


Basophils (%) (Auto)  1.0 % 


 


Neutrophils # (Auto)  4.1 TH/MM3 


 


Lymphocytes # (Auto)  3.4 TH/MM3 


 


Monocytes # (Auto)  0.7 TH/MM3 


 


Eosinophils # (Auto)  0.3 TH/MM3 


 


Basophils # (Auto)  0.1 TH/MM3 


 


CBC Comment  DIFF FINAL 


 


Differential Comment   


 


Blood Urea Nitrogen  9 MG/DL 


 


Creatinine  0.79 MG/DL 


 


Random Glucose  88 MG/DL 


 


Total Protein  7.3 GM/DL 


 


Albumin  3.6 GM/DL 


 


Calcium Level  8.5 MG/DL 


 


Alkaline Phosphatase  79 U/L 


 


Aspartate Amino Transf


(AST/SGOT) 


  22 U/L 


 


 


Alanine Aminotransferase


(ALT/SGPT) 


  33 U/L 


 


 


Total Bilirubin  0.5 MG/DL 


 


Sodium Level  136 MEQ/L 


 


Potassium Level  3.9 MEQ/L 


 


Chloride Level  103 MEQ/L 


 


Carbon Dioxide Level  28.2 MEQ/L 


 


Anion Gap  5 MEQ/L 


 


Estimat Glomerular Filtration


Rate 


  110 ML/MIN 


 


 


Lipase  59 U/L 











MDM


Medical Decision Making


Medical Screen Exam Complete:  Yes


Emergency Medical Condition:  Yes


Medical Record Reviewed:  Yes (this patient has been here numerous times.  His 

usual complaints or skin related or musculoskeletal related.)


Differential Diagnosis


Differential diagnosis of abdominal pain includes but is not limited to 

gastritis, pancreatitis, hepatitis, gastroenteritis, gallbladder disease, 

constipation, urinary retention, UTI, peptic ulcer disease, diverticulitis or 

appendicitis


Narrative Course


This patient presents with epigastric pain which radiates around her left upper 

abdomen and into the back.  His examination is relatively benign.  Mild 

tenderness without guarding or rebound.





CBC & BMP Diagram


1/23/18 21:15








Total Protein 7.3, Albumin 3.6, Calcium Level 8.5, Alkaline Phosphatase 79, 

Aspartate Amino Transf (AST/SGOT) 22, Alanine Aminotransferase (ALT/SGPT) 33, 

Total Bilirubin 0.5





UA negative





Abdominal x-ray to my interpretation shows that he is full of gas.





Diagnosis





 Primary Impression:  


 Epigastric pain


Patient Instructions:  Abdominal Pain (ED), General Instructions





***Additional Instructions:  


See your primary care doctor if symptoms continue


***Med/Other Pt SpecificInfo:  Prescription(s) given


Scripts


Dicyclomine (Bentyl) 10 Mg Cap


20 MG PO QID for Bowel Management, #10 CAP 0 Refills


   Prov: Negar Ennis MD         1/23/18


Disposition:  01 DISCHARGE HOME


Condition:  Stable











Negar Ennis MD Jan 23, 2018 21:21

## 2018-02-12 ENCOUNTER — HOSPITAL ENCOUNTER (EMERGENCY)
Dept: HOSPITAL 17 - NEPK | Age: 38
Discharge: HOME | End: 2018-02-12
Payer: MEDICAID

## 2018-02-12 VITALS
OXYGEN SATURATION: 97 % | DIASTOLIC BLOOD PRESSURE: 87 MMHG | TEMPERATURE: 98.5 F | SYSTOLIC BLOOD PRESSURE: 135 MMHG | HEART RATE: 80 BPM | RESPIRATION RATE: 18 BRPM
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
## 2018-02-12 DIAGNOSIS — S39.012A: Primary | ICD-10-CM

## 2018-02-12 PROCEDURE — 72100 X-RAY EXAM L-S SPINE 2/3 VWS: CPT

## 2018-02-12 PROCEDURE — 99283 EMERGENCY DEPT VISIT LOW MDM: CPT

## 2018-02-12 NOTE — RADRPT
EXAM DATE/TIME:  02/12/2018 19:33 

 

[facility] COMPARISON:     

No previous studies available for comparison.

 

                     

INDICATIONS :     

Low back pain, fell

                     

 

MEDICAL HISTORY :     

None.          

 

SURGICAL HISTORY :        

lumbar laminectomy L5-S1

 

ENCOUNTER:     

Initial                                        

 

ACUITY:     

1 day      

 

PAIN SCORE:     

9/10

 

LOCATION:       

Lumbar spine

 

FINDINGS:     

Two view examination was performed.  There are five non-rib bearing vertebral bodies.  The vertebral 
bodies are in normal alignment without evidence of subluxation or scoliosis. Moderate to severe degen
erative changes at the lumbosacral junction.

 

CONCLUSION:     

1. No acute findings. Degenerative disc disease predominantly at the lumbosacral junction.

 

 

 

 Kevin Head MD on February 12, 2018 at 20:04           

Board Certified Radiologist.

 This report was verified electronically.
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
